# Patient Record
Sex: FEMALE | Race: WHITE | Employment: OTHER | URBAN - METROPOLITAN AREA
[De-identification: names, ages, dates, MRNs, and addresses within clinical notes are randomized per-mention and may not be internally consistent; named-entity substitution may affect disease eponyms.]

---

## 2017-07-19 ENCOUNTER — OFFICE VISIT (OUTPATIENT)
Dept: OPHTHALMOLOGY | Facility: CLINIC | Age: 37
End: 2017-07-19

## 2017-07-19 DIAGNOSIS — H52.203 MYOPIA WITH ASTIGMATISM, BILATERAL: Primary | ICD-10-CM

## 2017-07-19 DIAGNOSIS — H52.13 MYOPIA WITH ASTIGMATISM, BILATERAL: Primary | ICD-10-CM

## 2017-07-19 ASSESSMENT — REFRACTION_MANIFEST
OS_SPHERE: -3.00
OD_SPHERE: -2.00
OD_AXIS: 005
OS_AXIS: 175
OD_CYLINDER: +2.00
OS_CYLINDER: +2.50

## 2017-07-19 ASSESSMENT — TONOMETRY
OD_IOP_MMHG: 16
OS_IOP_MMHG: 14
IOP_METHOD: ICARE

## 2017-07-19 ASSESSMENT — EXTERNAL EXAM - LEFT EYE: OS_EXAM: NORMAL

## 2017-07-19 ASSESSMENT — VISUAL ACUITY
OS_CC+: +2
CORRECTION_TYPE: GLASSES
OD_CC: 20/25
OS_CC: 20/30
METHOD: SNELLEN - LINEAR
OD_CC+: -2

## 2017-07-19 ASSESSMENT — SLIT LAMP EXAM - LIDS
COMMENTS: NORMAL
COMMENTS: NORMAL

## 2017-07-19 ASSESSMENT — CUP TO DISC RATIO
OS_RATIO: 0.25
OD_RATIO: 0.25

## 2017-07-19 ASSESSMENT — REFRACTION_WEARINGRX
OD_SPHERE: -1.00
OS_AXIS: 175
OS_SPHERE: -2.00
OS_CYLINDER: +2.00
OD_AXIS: 180
OD_CYLINDER: +1.00
SPECS_TYPE: SVL

## 2017-07-19 ASSESSMENT — CONF VISUAL FIELD
OS_NORMAL: 1
OD_NORMAL: 1

## 2017-07-19 ASSESSMENT — EXTERNAL EXAM - RIGHT EYE: OD_EXAM: NORMAL

## 2017-07-19 NOTE — MR AVS SNAPSHOT
After Visit Summary   2017    Wendie Bertrand    MRN: [id number]           Patient Information     Date Of Birth          1980        Visit Information        Provider Department      2017 10:40 AM Brendon Rosas MD [facility]        Today's Diagnoses     Myopia with astigmatism, bilateral    -  1       Follow-ups after your visit        Follow-up notes from your care team     Return in about 2 months (around 2017) for Follow Up-Dilate OU.      Who to contact     Please call your clinic at [phone] to:    Ask questions about your health    Make or cancel appointments    Discuss your medicines    Learn about your test results    Speak to your doctor   If you have compliments or concerns about an experience at your clinic, or if you wish to file a complaint, please contact [facility] Patient Relations at [phone] or email us at [email or web address]         Additional Information About Your Visit        MyChart Information     [employer] is an electronic gateway that provides easy, online access to your medical records. With [employer], you can request a clinic appointment, read your test results, renew a prescription or communicate with your care team.     To sign up for L[employer] visit the website at [email or web address] [email or web address]/[employer]   You will be asked to enter the access code listed below, as well as some personal information. Please follow the directions to create your username and password.     Your access code is: [email or web address]  Expires: 10/3/2017  6:30 AM     Your access code will  in 90 days. If you need help or a new code, please contact your [facility] or call [phone] for assistance.        Care EveryWhere ID     This is your Care EveryWhere ID. This could be used by other organizations to access your [place] medical records  [id number]         Blood Pressure  from Last 3 Encounters:   No data found for BP    Weight from Last 3 Encounters:   No data found for Wt              We Performed the Following     REFRACTION        Primary Care Provider    None Specified       No primary provider on file.        Equal Access to Services     ERASMO LOPEZ : Balbir Mejia, derek doran, tashimoose phillipsaprylcaitlyn cabrerajose carlos, waxrosy saiin hayaaneris cabrerakirk zamora monica vega. So [facility] [phone].    ATENCIÓN: Si habla español, tiene a collins disposición servicios gratuitos de asistencia lingüística. Llame al [phone].    We comply with applicable federal civil rights laws and [place] laws. We do not discriminate on the basis of race, color, national origin, age, disability sex, sexual orientation or gender identity.            Thank you!     Thank you for choosing [facility]  for your care. Our goal is always to provide you with excellent care. Hearing back from our patients is one way we can continue to improve our services. Please take a few minutes to complete the written survey that you may receive in the mail after your visit with us. Thank you!             Your Updated Medication List - Protect others around you: Learn how to safely use, store and throw away your medicines at www.disposemymeds.org.      Notice  As of 7/19/2017 11:59 PM    You have not been prescribed any medications.

## 2017-07-19 NOTE — NURSING NOTE
Chief Complaints and History of Present Illnesses   Patient presents with     COMPREHENSIVE EYE EXAM     HPI    Affected eye(s):  Both   Symptoms:     Decreased vision      Duration:  1 year   Frequency:  Constant       Do you have eye pain now?:  No      Comments:  Noted diminished VA since last baby born July, 2016  States can't stay for full exam but would like refraction.  Alejandra MARION 11:29 AM 07/19/2017

## 2017-07-20 NOTE — PROGRESS NOTES
Assessment & Plan      Wendie Bertrand is a 37 year old female with the following diagnoses:   (H52.13,  H52.203) Myopia with astigmatism, bilateral  (primary encounter diagnosis)  Comment: Change  Plan: REFRACTION        Rx for new glasses and CL's, send patient trial CLs, Dilate to finish exam at next visit-NO CHARGE then     -----------------------------------------------------------------------------------      Patient disposition:   Return in about 2 months (around 9/19/2017) for Follow Up-Dilate OU. or sooner as needed.    Complete documentation of historical and exam elements from today's encounter can  be found in the full encounter summary report (not reduplicated in this progress  note). I personally obtained the chief complaint(s) and history of present illness. I  confirmed and edited as necessary the review of systems, past medical/surgical  history, family history, social history, and examination findings as documented by  others; and I examined the patient myself. I personally reviewed the relevant tests,  images, and reports as documented above. I formulated and edited as necessary the  assessment and plan and discussed the findings and management plan with the  patient and family.    AKASH Rosas M.D

## 2017-09-28 ENCOUNTER — OFFICE VISIT (OUTPATIENT)
Dept: OPHTHALMOLOGY | Facility: CLINIC | Age: 37
End: 2017-09-28

## 2017-09-28 DIAGNOSIS — H52.13 MYOPIA WITH ASTIGMATISM, BILATERAL: Primary | ICD-10-CM

## 2017-09-28 DIAGNOSIS — H52.223 REGULAR ASTIGMATISM OF BOTH EYES: Primary | ICD-10-CM

## 2017-09-28 DIAGNOSIS — H52.203 MYOPIA WITH ASTIGMATISM, BILATERAL: Primary | ICD-10-CM

## 2017-09-28 ASSESSMENT — REFRACTION_WEARINGRX
OS_AXIS: 173
OS_CYLINDER: +2.50
OD_AXIS: 003
OS_CYLINDER: +2.50
OS_SPHERE: -2.50
OD_SPHERE: -2.00
OS_AXIS: 173
OD_CYLINDER: +1.50
OS_SPHERE: -2.50
OD_CYLINDER: +1.50
OD_AXIS: 003
OD_SPHERE: -2.00

## 2017-09-28 ASSESSMENT — REFRACTION_CURRENTRX
OD_SPHERE: PLANO
OD_CYLINDER: -1.75
OD_DIAMETER: 14.5
OS_BASECURVE: 8.7
OD_AXIS: 090
OD_CYLINDER: -1.75
OD_BRAND: AIR OPTIX FOR ASTIGMATISM
OS_SPHERE: -0.50
OD_DIAMETER: 14.5
OS_BRAND: AIR OPTIX FOR ASTIGMATISM
OD_AXIS: 090
OS_SPHERE: PLANO
OS_DIAMETER: 14.5
OS_BRAND: AIR OPTIX FOR ASTIGMATISM
OS_DIAMETER: 14.5
OS_AXIS: 090
OS_CYLINDER: -2.25
OD_BRAND: AIR OPTIX FOR ASTIGMATISM
OS_CYLINDER: -2.25
OD_SPHERE: PLANO
OS_AXIS: 090
OS_BASECURVE: 8.7
OD_BASECURVE: 8.7
OD_BASECURVE: 8.7

## 2017-09-28 ASSESSMENT — VISUAL ACUITY
OS_CC: 20/25
OS_CC: 20/25
METHOD: SNELLEN - LINEAR
METHOD: SNELLEN - LINEAR
OD_CC: 20/20
OS_CC+: -1
OD_CC: 20/20
CORRECTION_TYPE: GLASSES

## 2017-09-28 ASSESSMENT — EXTERNAL EXAM - RIGHT EYE: OD_EXAM: NORMAL

## 2017-09-28 ASSESSMENT — TONOMETRY
OD_IOP_MMHG: 15
IOP_METHOD: TONOPEN
OS_IOP_MMHG: 13
OD_IOP_MMHG: 15
OS_IOP_MMHG: 13
IOP_METHOD: ICARE

## 2017-09-28 ASSESSMENT — CONF VISUAL FIELD
OD_NORMAL: 1
OS_NORMAL: 1

## 2017-09-28 ASSESSMENT — REFRACTION_MANIFEST
OS_SPHERE: -2.75
OS_SPHERE: -2.75
OS_CYLINDER: +2.50
OD_CYLINDER: +1.75
OD_AXIS: 003
OS_AXIS: 173
OD_AXIS: 003
OD_SPHERE: -1.75
OS_CYLINDER: +2.50
OS_AXIS: 173
OD_SPHERE: -1.75
OD_CYLINDER: +1.75

## 2017-09-28 ASSESSMENT — SLIT LAMP EXAM - LIDS
COMMENTS: NORMAL
COMMENTS: NORMAL

## 2017-09-28 ASSESSMENT — EXTERNAL EXAM - LEFT EYE: OS_EXAM: NORMAL

## 2017-09-28 NOTE — PROGRESS NOTES
Assessment/Plan  (H52.223) Regular astigmatism of both eyes  (primary encounter diagnosis)  Comment: Myopic astigmatism OU  Plan: HC CONTACT LENS FITTING COSMETIC LVL 1 (47297.011)         Dispensed AirOptix for Astigmatism trials OU. Patient to contact clinic in 2 weeks, if comfort and vision are acceptable, okay to dispense finalized contact lens prescription.    Contact Lens Billing  V-Code:  - Soft toric     CL Fitting Fee: $75    These are for cosmetic contact lenses.    Encounter Diagnosis   Name Primary?     Regular astigmatism of both eyes Yes        Date of last eye exam: 9/28/17    Contact lens fitting fee assessed, no level of service as Dr. Rosas performed eye exam.    ADDENDUM: Patient contacted clinic requesting copy of contact lens prescription. Prescription generated.    Complete documentation of historical and exam elements from today's encounter can  be found in the full encounter summary report (not reduplicated in this progress  note). I personally obtained the chief complaint(s) and history of present illness. I  confirmed and edited as necessary the review of systems, past medical/surgical  history, family history, social history, and examination findings as documented by  others; and I examined the patient myself. I personally reviewed the relevant tests,  images, and reports as documented above. I formulated and edited as necessary the  assessment and plan and discussed the findings and management plan with the  patient and family.    Gustavo Arredondo OD, FAAO

## 2017-09-28 NOTE — MR AVS SNAPSHOT
After Visit Summary   2017    Wendie Bertrand    MRN: [id number]           Patient Information     Date Of Birth          1980        Visit Information        Provider Department      2017 10:00 AM Gustavo Arredondo OD [facility]        Today's Diagnoses     Regular astigmatism of both eyes    -  1       Follow-ups after your visit        Follow-up notes from your care team     Return if symptoms worsen or fail to improve, for Contact Lens Follow-up.      Who to contact     Please call your clinic at [phone] to:    Ask questions about your health    Make or cancel appointments    Discuss your medicines    Learn about your test results    Speak to your doctor   If you have compliments or concerns about an experience at your clinic, or if you wish to file a complaint, please contact [facility] Patient Relations at [phone] or email us at [email or web address]         Additional Information About Your Visit        MyChart Information     [employer] is an electronic gateway that provides easy, online access to your medical records. With [employer], you can request a clinic appointment, read your test results, renew a prescription or communicate with your care team.     To sign up for ABSt visit the website at www.[facility].[email or web address]   You will be asked to enter the access code listed below, as well as some personal information. Please follow the directions to create your username and password.     Your access code is: [email or web address]  Expires: 10/3/2017  6:30 AM     Your access code will  in 90 days. If you need help or a new code, please contact your [facility] or call [phone] for assistance.        Care EveryWhere ID     This is your Care EveryWhere ID. This could be used by other organizations to access your [place] medical records  [id number]         Blood Pressure from  Last 3 Encounters:   No data found for BP    Weight from Last 3 Encounters:   No data found for Wt              We Performed the Following     HC CONTACT LENS FITTING COSMETIC LVL 1 (42638.011)        Primary Care Provider    None Specified       No primary provider on file.        Equal Access to Services     ERASMO LOPEZ : Hadii aad ku hadnithinamy Jackie, wamauricioda luqadaha, qaybta kaalmada adecheyda, philomena saiin hayaaneris cabrerakirk frankpravin vega. So [facility] [phone].    ATENCIÓN: Si habla español, tiene a collins disposición servicios gratuitos de asistencia lingüística. Llame al [phone].    We comply with applicable federal civil rights laws and [place] laws. We do not discriminate on the basis of race, color, national origin, age, disability sex, sexual orientation or gender identity.            Thank you!     Thank you for choosing [facility]  for your care. Our goal is always to provide you with excellent care. Hearing back from our patients is one way we can continue to improve our services. Please take a few minutes to complete the written survey that you may receive in the mail after your visit with us. Thank you!             Your Updated Medication List - Protect others around you: Learn how to safely use, store and throw away your medicines at www.disposemymeds.org.      Notice  As of 9/28/2017 10:03 AM    You have not been prescribed any medications.

## 2017-09-28 NOTE — MR AVS SNAPSHOT
After Visit Summary   2017    Wendie Bertrand    MRN: [id number]           Patient Information     Date Of Birth          1980        Visit Information        Provider Department      2017 8:40 AM Brendon Rosas MD [facility]        Today's Diagnoses     Myopia with astigmatism, bilateral    -  1       Follow-ups after your visit        Follow-up notes from your care team     Return in about 1 year (around 2018) for Complete Eye Exam.      Who to contact     Please call your clinic at [phone] to:    Ask questions about your health    Make or cancel appointments    Discuss your medicines    Learn about your test results    Speak to your doctor   If you have compliments or concerns about an experience at your clinic, or if you wish to file a complaint, please contact [facility] Patient Relations at [phone] or email us at [email or web address]         Additional Information About Your Visit        MyChart Information     [employer] is an electronic gateway that provides easy, online access to your medical records. With [employer], you can request a clinic appointment, read your test results, renew a prescription or communicate with your care team.     To sign up for Rail[employer]t visit the website at [email or web address]   You will be asked to enter the access code listed below, as well as some personal information. Please follow the directions to create your username and password.     Your access code is: [email or web address]  Expires: 10/3/2017  6:30 AM     Your access code will  in 90 days. If you need help or a new code, please contact your [facility] or call [phone] for assistance.        Care EveryWhere ID     This is your Care EveryWhere ID. This could be used by other organizations to access your [place] medical records  [id number]         Blood Pressure from  Last 3 Encounters:   No data found for BP    Weight from Last 3 Encounters:   No data found for Wt              Today, you had the following     No orders found for display       Primary Care Provider    None Specified       No primary provider on file.        Equal Access to Services     ERASMO LOPEZ : Hadii aad ku hadnithinamy Lindaroz, derek nimanabil, loli contreras, philomena mezaneris cabrerakirk frankpravin vega. So [facility] [phone].    ATENCIÓN: Si habla español, tiene a collins disposición servicios gratuitos de asistencia lingüística. Llame al [phone].    We comply with applicable federal civil rights laws and [place] laws. We do not discriminate on the basis of race, color, national origin, age, disability sex, sexual orientation or gender identity.            Thank you!     Thank you for choosing [facility]  for your care. Our goal is always to provide you with excellent care. Hearing back from our patients is one way we can continue to improve our services. Please take a few minutes to complete the written survey that you may receive in the mail after your visit with us. Thank you!             Your Updated Medication List - Protect others around you: Learn how to safely use, store and throw away your medicines at www.disposemymeds.org.      Notice  As of 9/28/2017  1:14 PM    You have not been prescribed any medications.

## 2017-09-28 NOTE — PROGRESS NOTES
Assessment & Plan      Wendie Bertrand is a 37 year old female with the following diagnoses:   (H52.13,  H52.203) Myopia with astigmatism, bilateral  (primary encounter diagnosis)  Comment: Glasses Rx incorrect  Plan: Remake glasses--Glasses check-NO CHARGE VISIT     -----------------------------------------------------------------------------------      Patient disposition:   Return in about 1 year (around 9/28/2018) for Complete Eye Exam. or sooner as needed.    Complete documentation of historical and exam elements from today's encounter can  be found in the full encounter summary report (not reduplicated in this progress  note). I personally obtained the chief complaint(s) and history of present illness. I  confirmed and edited as necessary the review of systems, past medical/surgical  history, family history, social history, and examination findings as documented by  others; and I examined the patient myself. I personally reviewed the relevant tests,  images, and reports as documented above. I formulated and edited as necessary the  assessment and plan and discussed the findings and management plan with the  patient and family.    AKASH Rosas M.D

## 2018-05-16 ENCOUNTER — TELEPHONE (OUTPATIENT)
Dept: OPHTHALMOLOGY | Facility: CLINIC | Age: 38
End: 2018-05-16

## 2019-03-11 ENCOUNTER — TELEPHONE (OUTPATIENT)
Dept: OPHTHALMOLOGY | Facility: CLINIC | Age: 39
End: 2019-03-11

## 2019-03-12 ENCOUNTER — TELEPHONE (OUTPATIENT)
Dept: OPHTHALMOLOGY | Facility: CLINIC | Age: 39
End: 2019-03-12

## 2019-03-12 NOTE — TELEPHONE ENCOUNTER
Reviewed pupillary distance-- center of one pupil to other in millimeters  Reviewed typically done by optical clinic where pt's purchase glasses  If aware purchasing online able to perform at appt    Reviewed may come here to have done, or may see local optical clinic who per other pt's feedback will check at no cost and pleasant about check    Provided direct number review further  Juan David Peter RN 12:44 PM 03/12/19

## 2019-03-12 NOTE — TELEPHONE ENCOUNTER
[facility] Call Center    Phone Message    May a detailed message be left on voicemail: yes    Reason for Call: Other: Pt needs to know if pupillary distance measurement was done at last refraction. If not, pleae call pt to talk about what she needs to do as Helena Estrada needs this measurement.     Action Taken: Message routed to:  [facility] Eye

## 2019-03-12 NOTE — TELEPHONE ENCOUNTER
[facility] Call Center    Phone Message    May a detailed message be left on voicemail: yes    Reason for Call: Other: Pt is calling back to let Dr. Gustavo Arredondo's team know that pupilary distance in the right eye is 30 and the left pupilary distance is 29. Please fax the prescription to [phone] ref# [id number]. Thank you     Action Taken: Message routed to:  [facility] MME       Pt glasses prescription faxed to number left in message. ~ [facility]    Alejandra Zimmer COT 2:05 PM 03/12/2019

## 2019-03-12 NOTE — TELEPHONE ENCOUNTER
[facility] Call Center    Phone Message    May a detailed message be left on voicemail: yes    Reason for Call: Other: The pt would like her perscription faxed to Helena Estrada at fax [phone] and put Ref# [id number] on the fax. Thanks.     Action Taken: Message routed to:  [facility] tim eye gen

## 2019-03-13 ENCOUNTER — TELEPHONE (OUTPATIENT)
Dept: OPHTHALMOLOGY | Facility: CLINIC | Age: 39
End: 2019-03-13

## 2019-03-13 NOTE — TELEPHONE ENCOUNTER
Glasses Rx faxed at [phone] today with reference number and pupillary distance on Rx  Juan David Peter RN 4:36 PM 03/13/19        [facility] Call Center    Phone Message    May a detailed message be left on voicemail: yes    Reason for Call: Other:  Pt is calling back to let Dr. Gustavo Arredondo's team know that pupilary distance in the right eye is 30 and the left pupilary distance is 29. Please fax the prescription to [phone] ref# [id number]. Thank you      Action Taken: Message routed to:  [facility] Eye

## 2020-01-15 ENCOUNTER — TELEPHONE (OUTPATIENT)
Dept: OPHTHALMOLOGY | Facility: CLINIC | Age: 40
End: 2020-01-15

## 2020-01-15 NOTE — TELEPHONE ENCOUNTER
Pt calling to review updating glasses Rx    Pt last seen in  by Dr. Arredondo at [facility] clinic    Rx      Reviewed may schedule eye exam with Dr. Arredondo for updated Rx and may take to optical clinic of choice.  Pt may schedule eye exam at optical shop also for updated Rx/glasses dispensing    Pt will see local optical clinic at this time  Juan David Peter RN 8:45 AM 01/15/20

## 2021-01-27 ENCOUNTER — IMMUNIZATION (OUTPATIENT)
Dept: NURSING | Facility: CLINIC | Age: 41
End: 2021-01-27
Payer: COMMERCIAL

## 2021-01-27 PROCEDURE — 91300 PR COVID VAC PFIZER DIL RECON 30 MCG/0.3 ML IM: CPT

## 2021-01-27 PROCEDURE — 0001A PR COVID VAC PFIZER DIL RECON 30 MCG/0.3 ML IM: CPT

## 2021-02-13 ENCOUNTER — HEALTH MAINTENANCE LETTER (OUTPATIENT)
Age: 41
End: 2021-02-13

## 2021-02-17 ENCOUNTER — IMMUNIZATION (OUTPATIENT)
Dept: NURSING | Facility: CLINIC | Age: 41
End: 2021-02-17
Attending: INTERNAL MEDICINE
Payer: COMMERCIAL

## 2021-02-17 PROCEDURE — 91300 PR COVID VAC PFIZER DIL RECON 30 MCG/0.3 ML IM: CPT

## 2021-02-17 PROCEDURE — 0002A PR COVID VAC PFIZER DIL RECON 30 MCG/0.3 ML IM: CPT

## 2021-09-19 ENCOUNTER — HEALTH MAINTENANCE LETTER (OUTPATIENT)
Age: 41
End: 2021-09-19

## 2021-12-14 ENCOUNTER — TELEPHONE (OUTPATIENT)
Dept: SLEEP MEDICINE | Facility: CLINIC | Age: 41
End: 2021-12-14
Payer: COMMERCIAL

## 2021-12-14 NOTE — TELEPHONE ENCOUNTER
Patient scheduled tomorrow with audiology and Dr. Wiggins for dizziness and fascial pressure.  Lisa Cadena RN on 12/14/2021 at 9:55 AM

## 2021-12-14 NOTE — TELEPHONE ENCOUNTER
Reason for Call:  Same Day Appointment, Requested Provider:  Rosalie    PCP: No primary care provider on file.    Reason for visit: dizziness and facial pressure    Duration of symptoms: several weeks    Have you been treated for this in the past? Yes    Additional comments: Pt is willing to go to ANY clinic that Rosalie could squeeze her in before Christmas    Can we leave a detailed message on this number? YES    Phone number patient can be reached at: Cell number on file:    Telephone Information:   Mobile [phone]       Best Time: any    Call taken on 12/14/2021 at 7:56 AM by Belinda Whatley

## 2021-12-15 ENCOUNTER — OFFICE VISIT (OUTPATIENT)
Dept: OTOLARYNGOLOGY | Facility: OTHER | Age: 41
End: 2021-12-15
Payer: COMMERCIAL

## 2021-12-15 ENCOUNTER — OFFICE VISIT (OUTPATIENT)
Dept: AUDIOLOGY | Facility: OTHER | Age: 41
End: 2021-12-15
Payer: COMMERCIAL

## 2021-12-15 VITALS — DIASTOLIC BLOOD PRESSURE: 64 MMHG | TEMPERATURE: 98.9 F | SYSTOLIC BLOOD PRESSURE: 118 MMHG

## 2021-12-15 DIAGNOSIS — H93.8X3 PRESSURE SENSATION IN EAR, BILATERAL: ICD-10-CM

## 2021-12-15 DIAGNOSIS — J01.90 ACUTE SINUSITIS WITH SYMPTOMS > 10 DAYS: Primary | ICD-10-CM

## 2021-12-15 DIAGNOSIS — R42 DIZZINESS: Primary | ICD-10-CM

## 2021-12-15 DIAGNOSIS — R42 DIZZINESS: ICD-10-CM

## 2021-12-15 DIAGNOSIS — H93.8X3 EAR PRESSURE, BILATERAL: ICD-10-CM

## 2021-12-15 PROCEDURE — 99207 PR NO CHARGE LOS: CPT | Performed by: AUDIOLOGIST

## 2021-12-15 PROCEDURE — 92550 TYMPANOMETRY & REFLEX THRESH: CPT | Performed by: AUDIOLOGIST

## 2021-12-15 PROCEDURE — 99204 OFFICE O/P NEW MOD 45 MIN: CPT | Performed by: OTOLARYNGOLOGY

## 2021-12-15 PROCEDURE — 92557 COMPREHENSIVE HEARING TEST: CPT | Performed by: AUDIOLOGIST

## 2021-12-15 RX ORDER — FLUTICASONE PROPIONATE 50 MCG
2 SPRAY, SUSPENSION (ML) NASAL DAILY
Qty: 16 G | Refills: 1 | Status: SHIPPED | OUTPATIENT
Start: 2021-12-15 | End: 2023-05-15

## 2021-12-15 RX ORDER — TRAZODONE HYDROCHLORIDE 50 MG/1
TABLET, FILM COATED ORAL
COMMUNITY
Start: 2021-01-19

## 2021-12-15 NOTE — LETTER
"    12/15/2021         RE: Wendie Bertrand  [address] [facility] [address]        Dear Colleague,    Thank you for referring your patient, Wendie Bertrand, to the [facility]. Please see a copy of my visit note below.    ENT Consultation    Wendie Bertrand who is a 41 year old female seen in consultation at the request of self.      History of Present Illness - Wendie Bertrand is a 41 year old female presents with chief complaint of nonspecific facial pressure dizziness.  Basically problem started about 5 weeks ago when she started having some discomfort at the angle of the jaw on the left side.  Sore chiropractor because she felt very stressed and the chiropractor told her she has a little gland in that area.  Then soon thereafter she got her Pfizer booster and then started experiencing some nonspecific dizziness or off-balance sensation little bit of feeling of \"swaying\" with eyes closed no true vertigo symptoms no nausea accompanying it.  No visual disturbances no history of migraines.  Then she started feeling some slight fever chills and malaise.  She saw her primary care provider because she complained a little nasal congestion drainage mostly clear anteriorly and posteriorly with some soreness of the throat frontal pressure and maxillary pressure was diagnosed with sinusitis given a course of Augmentin for 10 days and started on Flonase and the symptoms got better.  However she still has slight feeling of off-balance even though not as bad especially with eyes closed her ear pressure that was there before is significant improved still gets maxillary sinus pressure and some clear postnasal drainage.  She has not used Flonase lately.  The drainage is mostly clear and \"stringy\".  Denies any changes in sense of smell or taste in this period of time.  No other neurological symptoms noted.      There is no height or weight on file to calculate BMI.        BP Readings " from Last 1 Encounters:   12/15/21 118/64       BP noted to be well controlled today in office.     Wendie IS NOT a smoker/uses chewing tobacco.        Past Medical History - History reviewed. No pertinent past medical history.    Current Medications -   Current Outpatient Medications:      traZODone (DESYREL) 50 MG tablet, , Disp: , Rfl:     Allergies -   Allergies   Allergen Reactions     Bismuth Subsalicylate Hives     Pepto Bismo       Social History -   Social History     Socioeconomic History     Marital status:      Spouse name: Not on file     Number of children: Not on file     Years of education: Not on file     Highest education level: Not on file   Occupational History     Not on file   Tobacco Use     Smoking status: Not on file     Smokeless tobacco: Not on file   Substance and Sexual Activity     Alcohol use: Not on file     Drug use: Not on file     Sexual activity: Not on file   Other Topics Concern     Not on file   Social History Narrative     Not on file     Social Determinants of Health     Financial Resource Strain: Not on file   Food Insecurity: Not on file   Transportation Needs: Not on file   Physical Activity: Not on file   Stress: Not on file   Social Connections: Not on file   Intimate Partner Violence: Not on file   Housing Stability: Not on file       Family History -   Family History   Problem Relation Age of Onset     Glaucoma No family hx of      Macular Degeneration No family hx of      Amblyopia No family hx of      Retinal detachment No family hx of        Review of Systems - As per HPI and PMHx, otherwise review of system review of the head and neck negative. Otherwise 10+ review of system is negative    Physical Exam  /64   Temp 98.9  F (37.2  C) (Temporal)   BMI: There is no height or weight on file to calculate BMI.    General - The patient is well nourished and well developed, and appears to have good nutritional status.  Alert and oriented to person and  place, answers questions and cooperates with examination appropriately.    SKIN - No suspicious lesions or rashes.  Respiration - No respiratory distress.  Head and Face - Normocephalic and atraumatic, with no gross asymmetry noted of the contour of the facial features.  The facial nerve is intact, with strong symmetric movements.    Voice and Breathing - The patient was breathing comfortably without the use of accessory muscles. The patients voice was clear and strong, and had appropriate pitch and quality.    Ears - Bilateral pinna and EACs with normal appearing overlying skin. Tympanic membrane intact with good mobility on pneumatic otoscopy bilaterally. Bony landmarks of the ossicular chain are normal. The tympanic membranes are normal in appearance. No retraction, perforation, or masses.  No fluid or purulence was seen in the external canal or the middle ear.     Eyes - Extraocular movements intact.  Sclera were not icteric or injected, conjunctiva were pink and moist.    Mouth - Examination of the oral cavity showed pink, healthy oral mucosa. No lesions or ulcerations noted.  The tongue was mobile and midline, and the dentition were in good condition.      Throat - The walls of the oropharynx were smooth, pink, moist, symmetric, and had no lesions or ulcerations.  Slightly erythematous streaks noted along posterior pharyngeal pillars without active exudates.  The tonsillar pillars and soft palate were symmetric.  The uvula was midline on elevation.    Neck - Normal midline excursion of the laryngotracheal complex during swallowing.  Full range of motion on passive movement.  Palpation of the occipital, submental, submandibular, internal jugular chain, and supraclavicular nodes did not demonstrate any abnormal lymph nodes or masses.  The carotid pulse was palpable bilaterally.  Palpation of the thyroid was soft and smooth, with no nodules or goiter appreciated.  The trachea was mobile and midline.    Nose -  External contour is symmetric, no gross deflection or scars.  Nasal mucosa is erythematous and moist with no abnormal mucus.  The septum was midline and non-obstructive, turbinates of enlarged size and position.  No polyps, masses, or purulence noted on examination.    Neuro - Nonfocal neuro exam is normal, CN 2 through 12 intact, normal gait and muscle tone.      Performed in clinic today:  No procedures preformed in clinic today      A/P - Wendie Bertrand is a 41 year old female presents with symptoms of possible mild sinusitis she was treated of course.  She may have some residual symptoms.  At this point will start her on the fluticasone again as well as over-the-counter cetirizine 10 mg daily to dry up secretions may also help her balance a bit.  Dizziness is nonspecific.  In the absence of other neurological symptoms at this point would not jump to imaging right away.  Patient is also instructed on stress reduction techniques.  She is taking a vacation soon and will be exposed a lot of salty C air which will be helpful for her nose and sinuses.  You can try and reduce inflammation in the sinuses.  In regard to your dizziness if this persists certainly then further work-up including imaging such as MRI and even [facility] she was already previously referred but has not followed through yet may be of use.  Patient will see him back in a month to discuss her symptoms at that time and decide on further course of care.      Marcelino Wiggins MD        Again, thank you for allowing me to participate in the care of your patient.        Sincerely,        Marcelino Wiggins MD, MD

## 2021-12-15 NOTE — PROGRESS NOTES
"ENT Consultation    Wendie Bertrand who is a 41 year old female seen in consultation at the request of self.      History of Present Illness - Wendie Bertrand is a 41 year old female presents with chief complaint of nonspecific facial pressure dizziness.  Basically problem started about 5 weeks ago when she started having some discomfort at the angle of the jaw on the left side.  Sore chiropractor because she felt very stressed and the chiropractor told her she has a little gland in that area.  Then soon thereafter she got her Pfizer booster and then started experiencing some nonspecific dizziness or off-balance sensation little bit of feeling of \"swaying\" with eyes closed no true vertigo symptoms no nausea accompanying it.  No visual disturbances no history of migraines.  Then she started feeling some slight fever chills and malaise.  She saw her primary care provider because she complained a little nasal congestion drainage mostly clear anteriorly and posteriorly with some soreness of the throat frontal pressure and maxillary pressure was diagnosed with sinusitis given a course of Augmentin for 10 days and started on Flonase and the symptoms got better.  However she still has slight feeling of off-balance even though not as bad especially with eyes closed her ear pressure that was there before is significant improved still gets maxillary sinus pressure and some clear postnasal drainage.  She has not used Flonase lately.  The drainage is mostly clear and \"stringy\".  Denies any changes in sense of smell or taste in this period of time.  No other neurological symptoms noted.      There is no height or weight on file to calculate BMI.        BP Readings from Last 1 Encounters:   12/15/21 118/64       BP noted to be well controlled today in office.     Wendie IS NOT a smoker/uses chewing tobacco.        Past Medical History - History reviewed. No pertinent past medical history.    Current Medications - "   Current Outpatient Medications:      traZODone (DESYREL) 50 MG tablet, , Disp: , Rfl:     Allergies -   Allergies   Allergen Reactions     Bismuth Subsalicylate Hives     Pepto Bismo       Social History -   Social History     Socioeconomic History     Marital status:      Spouse name: Not on file     Number of children: Not on file     Years of education: Not on file     Highest education level: Not on file   Occupational History     Not on file   Tobacco Use     Smoking status: Not on file     Smokeless tobacco: Not on file   Substance and Sexual Activity     Alcohol use: Not on file     Drug use: Not on file     Sexual activity: Not on file   Other Topics Concern     Not on file   Social History Narrative     Not on file     Social Determinants of Health     Financial Resource Strain: Not on file   Food Insecurity: Not on file   Transportation Needs: Not on file   Physical Activity: Not on file   Stress: Not on file   Social Connections: Not on file   Intimate Partner Violence: Not on file   Housing Stability: Not on file       Family History -   Family History   Problem Relation Age of Onset     Glaucoma No family hx of      Macular Degeneration No family hx of      Amblyopia No family hx of      Retinal detachment No family hx of        Review of Systems - As per HPI and PMHx, otherwise review of system review of the head and neck negative. Otherwise 10+ review of system is negative    Physical Exam  /64   Temp 98.9  F (37.2  C) (Temporal)   BMI: There is no height or weight on file to calculate BMI.    General - The patient is well nourished and well developed, and appears to have good nutritional status.  Alert and oriented to person and place, answers questions and cooperates with examination appropriately.    SKIN - No suspicious lesions or rashes.  Respiration - No respiratory distress.  Head and Face - Normocephalic and atraumatic, with no gross asymmetry noted of the contour of the facial  features.  The facial nerve is intact, with strong symmetric movements.    Voice and Breathing - The patient was breathing comfortably without the use of accessory muscles. The patients voice was clear and strong, and had appropriate pitch and quality.    Ears - Bilateral pinna and EACs with normal appearing overlying skin. Tympanic membrane intact with good mobility on pneumatic otoscopy bilaterally. Bony landmarks of the ossicular chain are normal. The tympanic membranes are normal in appearance. No retraction, perforation, or masses.  No fluid or purulence was seen in the external canal or the middle ear.     Eyes - Extraocular movements intact.  Sclera were not icteric or injected, conjunctiva were pink and moist.    Mouth - Examination of the oral cavity showed pink, healthy oral mucosa. No lesions or ulcerations noted.  The tongue was mobile and midline, and the dentition were in good condition.      Throat - The walls of the oropharynx were smooth, pink, moist, symmetric, and had no lesions or ulcerations.  Slightly erythematous streaks noted along posterior pharyngeal pillars without active exudates.  The tonsillar pillars and soft palate were symmetric.  The uvula was midline on elevation.    Neck - Normal midline excursion of the laryngotracheal complex during swallowing.  Full range of motion on passive movement.  Palpation of the occipital, submental, submandibular, internal jugular chain, and supraclavicular nodes did not demonstrate any abnormal lymph nodes or masses.  The carotid pulse was palpable bilaterally.  Palpation of the thyroid was soft and smooth, with no nodules or goiter appreciated.  The trachea was mobile and midline.    Nose - External contour is symmetric, no gross deflection or scars.  Nasal mucosa is erythematous and moist with no abnormal mucus.  The septum was midline and non-obstructive, turbinates of enlarged size and position.  No polyps, masses, or purulence noted on  examination.    Neuro - Nonfocal neuro exam is normal, CN 2 through 12 intact, normal gait and muscle tone.      Performed in clinic today:  Audiogram was performed today and appears to be normal.  Type a tympanograms observed bilaterally with normal canal volumes.  Normal pure-tone thresholds appreciated bilaterally.  Word recognition score was 92% on the right and 96% on the left.      A/P - Wendie Bertrand is a 41 year old female presents with symptoms of possible mild sinusitis she was treated of course.  She may have some residual symptoms.  At this point will start her on the fluticasone again as well as over-the-counter cetirizine 10 mg daily to dry up secretions may also help her balance a bit.  Dizziness is nonspecific.  In the absence of other neurological symptoms at this point would not jump to imaging right away.  Patient is also instructed on stress reduction techniques.  She is taking a vacation soon and will be exposed a lot of salty C air which will be helpful for her nose and sinuses.  You can try and reduce inflammation in the sinuses.  In regard to your dizziness if this persists certainly then further work-up including imaging such as MRI and even [facility] she was already previously referred but has not followed through yet may be of use.  Patient will see him back in a month to discuss her symptoms at that time and decide on further course of care.      Marcelino Wiggins MD

## 2021-12-15 NOTE — PROGRESS NOTES
AUDIOLOGY REPORT:    Patient was referred from ENT by Dr. Wiggins for audiology evaluation. The patient reports dizziness that started suddenly around four weeks ago and is still present. She reports that it feels like swaying on a boat. She reports that it is not positional and does not feel like a spinning sensation. She reports that she initially had a low grade fever and was diagnosed with a sinus infection. She was prescribed antibiotics and notes that the dizziness got worse again after she finished the antibiotics. She notes sinus symptoms. The patient reports that she got her COVID booster shortly before the dizziness started. She reports that she may have had some change in hearing. She reports pressure in both ears but was told that her eardrums look normal. She does not have ear pain. The patient reports that she does not have tinnitus or a history of ear problems or ear surgery. The patient reports occasional noise exposure.    Testing:    Otoscopy:   Otoscopic exam indicates ears are clear of cerumen bilaterally     Tympanograms:    RIGHT: normal eardrum mobility     LEFT:   normal eardrum mobility    Reflexes (reported by stimulus ear):  RIGHT: Ipsilateral is present at normal levels  RIGHT: Contralateral is present at normal levels  LEFT:   Ipsilateral is present at normal levels  LEFT:   Contralateral is present at normal levels    Thresholds:   Pure Tone Thresholds assessed using conventional audiometry with good reliability from 250-8000 Hz bilaterally using insert earphones and circumaural headphones     RIGHT:  normal hearing sensitivity at all tested frequencies     LEFT:    normal hearing sensitivity at all tested frequencies     Speech Reception Threshold:    RIGHT: 5 dB HL    LEFT:   5 dB HL  Results are in agreement with pure tone average.     Word Recognition Score:     RIGHT: 92% at 50 dB HL using NU-6 recorded word list.    LEFT:   96% at 50 dB HL using NU-6 recorded word  list.    Discussed results with the patient.     Patient was returned to ENT for follow up.     Marine Frausto, CCC-A  Licensed Audiologist #[id number]  12/15/2021

## 2021-12-27 DIAGNOSIS — J01.00 ACUTE NON-RECURRENT MAXILLARY SINUSITIS: Primary | ICD-10-CM

## 2021-12-27 RX ORDER — AZITHROMYCIN 250 MG/1
500 TABLET, FILM COATED ORAL DAILY
Qty: 6 TABLET | Refills: 0 | Status: SHIPPED | OUTPATIENT
Start: 2021-12-27 | End: 2022-01-01

## 2022-03-06 ENCOUNTER — HEALTH MAINTENANCE LETTER (OUTPATIENT)
Age: 42
End: 2022-03-06

## 2022-11-21 ENCOUNTER — HEALTH MAINTENANCE LETTER (OUTPATIENT)
Age: 42
End: 2022-11-21

## 2023-07-08 ENCOUNTER — HEALTH MAINTENANCE LETTER (OUTPATIENT)
Age: 43
End: 2023-07-08

## 2023-11-25 ENCOUNTER — HEALTH MAINTENANCE LETTER (OUTPATIENT)
Age: 43
End: 2023-11-25